# Patient Record
Sex: MALE | Race: WHITE | NOT HISPANIC OR LATINO
[De-identification: names, ages, dates, MRNs, and addresses within clinical notes are randomized per-mention and may not be internally consistent; named-entity substitution may affect disease eponyms.]

---

## 2018-04-11 PROBLEM — Z00.00 ENCOUNTER FOR PREVENTIVE HEALTH EXAMINATION: Status: ACTIVE | Noted: 2018-04-11

## 2018-04-12 ENCOUNTER — APPOINTMENT (OUTPATIENT)
Dept: HEART AND VASCULAR | Facility: CLINIC | Age: 53
End: 2018-04-12

## 2018-06-15 ENCOUNTER — APPOINTMENT (OUTPATIENT)
Dept: HEART AND VASCULAR | Facility: CLINIC | Age: 53
End: 2018-06-15
Payer: COMMERCIAL

## 2018-06-15 VITALS
HEART RATE: 87 BPM | HEIGHT: 78 IN | DIASTOLIC BLOOD PRESSURE: 70 MMHG | SYSTOLIC BLOOD PRESSURE: 115 MMHG | WEIGHT: 163.14 LBS | BODY MASS INDEX: 18.88 KG/M2

## 2018-06-15 PROCEDURE — 93000 ELECTROCARDIOGRAM COMPLETE: CPT

## 2018-06-15 PROCEDURE — 99205 OFFICE O/P NEW HI 60 MIN: CPT | Mod: 25

## 2018-06-20 ENCOUNTER — FORM ENCOUNTER (OUTPATIENT)
Age: 53
End: 2018-06-20

## 2018-06-21 ENCOUNTER — OUTPATIENT (OUTPATIENT)
Dept: OUTPATIENT SERVICES | Facility: HOSPITAL | Age: 53
LOS: 1 days | Discharge: HOME | End: 2018-06-21
Payer: COMMERCIAL

## 2018-06-21 LAB
ALBUMIN SERPL ELPH-MCNC: 4.7 G/DL — SIGNIFICANT CHANGE UP (ref 3.3–5)
ALP SERPL-CCNC: 55 U/L — SIGNIFICANT CHANGE UP (ref 40–120)
ALT FLD-CCNC: 40 U/L — SIGNIFICANT CHANGE UP (ref 10–45)
ANION GAP SERPL CALC-SCNC: 13 MMOL/L — SIGNIFICANT CHANGE UP (ref 5–17)
APTT BLD: 38.9 SEC — HIGH (ref 27.5–37.4)
AST SERPL-CCNC: 28 U/L — SIGNIFICANT CHANGE UP (ref 10–40)
BILIRUB SERPL-MCNC: 0.7 MG/DL — SIGNIFICANT CHANGE UP (ref 0.2–1.2)
BUN SERPL-MCNC: 24 MG/DL — HIGH (ref 7–23)
CALCIUM SERPL-MCNC: 9.6 MG/DL — SIGNIFICANT CHANGE UP (ref 8.4–10.5)
CHLORIDE SERPL-SCNC: 104 MMOL/L — SIGNIFICANT CHANGE UP (ref 96–108)
CO2 SERPL-SCNC: 25 MMOL/L — SIGNIFICANT CHANGE UP (ref 22–31)
CREAT SERPL-MCNC: 1.24 MG/DL — SIGNIFICANT CHANGE UP (ref 0.5–1.3)
GLUCOSE SERPL-MCNC: 101 MG/DL — HIGH (ref 70–99)
HCT VFR BLD CALC: 50.3 % — HIGH (ref 39–50)
HGB BLD-MCNC: 17 G/DL — SIGNIFICANT CHANGE UP (ref 13–17)
INR BLD: 1.29 — HIGH (ref 0.88–1.16)
MCHC RBC-ENTMCNC: 29.5 PG — SIGNIFICANT CHANGE UP (ref 27–34)
MCHC RBC-ENTMCNC: 33.8 G/DL — SIGNIFICANT CHANGE UP (ref 32–36)
MCV RBC AUTO: 87.2 FL — SIGNIFICANT CHANGE UP (ref 80–100)
PLATELET # BLD AUTO: 211 K/UL — SIGNIFICANT CHANGE UP (ref 150–400)
POTASSIUM SERPL-MCNC: 4.2 MMOL/L — SIGNIFICANT CHANGE UP (ref 3.5–5.3)
POTASSIUM SERPL-SCNC: 4.2 MMOL/L — SIGNIFICANT CHANGE UP (ref 3.5–5.3)
PROT SERPL-MCNC: 7.2 G/DL — SIGNIFICANT CHANGE UP (ref 6–8.3)
PROTHROM AB SERPL-ACNC: 14.4 SEC — HIGH (ref 9.8–12.7)
RBC # BLD: 5.77 M/UL — SIGNIFICANT CHANGE UP (ref 4.2–5.8)
RBC # FLD: 13.1 % — SIGNIFICANT CHANGE UP (ref 10.3–16.9)
SODIUM SERPL-SCNC: 142 MMOL/L — SIGNIFICANT CHANGE UP (ref 135–145)
WBC # BLD: 7.1 K/UL — SIGNIFICANT CHANGE UP (ref 3.8–10.5)
WBC # FLD AUTO: 7.1 K/UL — SIGNIFICANT CHANGE UP (ref 3.8–10.5)

## 2018-06-21 PROCEDURE — 93325 DOPPLER ECHO COLOR FLOW MAPG: CPT | Mod: 26

## 2018-06-21 PROCEDURE — 80053 COMPREHEN METABOLIC PANEL: CPT

## 2018-06-21 PROCEDURE — 85730 THROMBOPLASTIN TIME PARTIAL: CPT

## 2018-06-21 PROCEDURE — 85610 PROTHROMBIN TIME: CPT

## 2018-06-21 PROCEDURE — 85027 COMPLETE CBC AUTOMATED: CPT

## 2018-06-21 PROCEDURE — 93312 ECHO TRANSESOPHAGEAL: CPT

## 2018-06-21 PROCEDURE — 93320 DOPPLER ECHO COMPLETE: CPT | Mod: 26

## 2018-06-21 PROCEDURE — 93312 ECHO TRANSESOPHAGEAL: CPT | Mod: 26

## 2018-06-21 PROCEDURE — 92960 CARDIOVERSION ELECTRIC EXT: CPT

## 2018-06-22 ENCOUNTER — RX RENEWAL (OUTPATIENT)
Age: 53
End: 2018-06-22

## 2018-06-26 ENCOUNTER — FORM ENCOUNTER (OUTPATIENT)
Age: 53
End: 2018-06-26

## 2018-06-27 ENCOUNTER — APPOINTMENT (OUTPATIENT)
Dept: CT IMAGING | Facility: HOSPITAL | Age: 53
End: 2018-06-27
Payer: COMMERCIAL

## 2018-06-27 ENCOUNTER — OUTPATIENT (OUTPATIENT)
Dept: OUTPATIENT SERVICES | Facility: HOSPITAL | Age: 53
LOS: 1 days | End: 2018-06-27
Payer: COMMERCIAL

## 2018-06-27 PROCEDURE — 75572 CT HRT W/3D IMAGE: CPT | Mod: 26

## 2018-06-28 ENCOUNTER — INPATIENT (INPATIENT)
Facility: HOSPITAL | Age: 53
LOS: 0 days | Discharge: HOME | DRG: 272 | End: 2018-06-29
Attending: INTERNAL MEDICINE | Admitting: INTERNAL MEDICINE
Payer: COMMERCIAL

## 2018-06-28 VITALS
SYSTOLIC BLOOD PRESSURE: 130 MMHG | DIASTOLIC BLOOD PRESSURE: 80 MMHG | OXYGEN SATURATION: 100 % | HEART RATE: 60 BPM | RESPIRATION RATE: 15 BRPM

## 2018-06-28 DIAGNOSIS — I48.0 PAROXYSMAL ATRIAL FIBRILLATION: ICD-10-CM

## 2018-06-28 LAB
ANION GAP SERPL CALC-SCNC: 13 MMOL/L — SIGNIFICANT CHANGE UP (ref 5–17)
APTT BLD: 39.9 SEC — HIGH (ref 27.5–37.4)
BUN SERPL-MCNC: 16 MG/DL — SIGNIFICANT CHANGE UP (ref 7–23)
CALCIUM SERPL-MCNC: 9.1 MG/DL — SIGNIFICANT CHANGE UP (ref 8.4–10.5)
CHLORIDE SERPL-SCNC: 104 MMOL/L — SIGNIFICANT CHANGE UP (ref 96–108)
CO2 SERPL-SCNC: 24 MMOL/L — SIGNIFICANT CHANGE UP (ref 22–31)
CREAT SERPL-MCNC: 1.41 MG/DL — HIGH (ref 0.5–1.3)
GLUCOSE SERPL-MCNC: 99 MG/DL — SIGNIFICANT CHANGE UP (ref 70–99)
HCT VFR BLD CALC: 41.6 % — SIGNIFICANT CHANGE UP (ref 39–50)
HGB BLD-MCNC: 13.8 G/DL — SIGNIFICANT CHANGE UP (ref 13–17)
INR BLD: 1.4 — HIGH (ref 0.88–1.16)
MCHC RBC-ENTMCNC: 29 PG — SIGNIFICANT CHANGE UP (ref 27–34)
MCHC RBC-ENTMCNC: 33.2 G/DL — SIGNIFICANT CHANGE UP (ref 32–36)
MCV RBC AUTO: 87.4 FL — SIGNIFICANT CHANGE UP (ref 80–100)
PLATELET # BLD AUTO: 202 K/UL — SIGNIFICANT CHANGE UP (ref 150–400)
POTASSIUM SERPL-MCNC: 4.1 MMOL/L — SIGNIFICANT CHANGE UP (ref 3.5–5.3)
POTASSIUM SERPL-SCNC: 4.1 MMOL/L — SIGNIFICANT CHANGE UP (ref 3.5–5.3)
PROTHROM AB SERPL-ACNC: 15.7 SEC — HIGH (ref 9.8–12.7)
RBC # BLD: 4.76 M/UL — SIGNIFICANT CHANGE UP (ref 4.2–5.8)
RBC # FLD: 13 % — SIGNIFICANT CHANGE UP (ref 10.3–16.9)
SODIUM SERPL-SCNC: 141 MMOL/L — SIGNIFICANT CHANGE UP (ref 135–145)
WBC # BLD: 7.2 K/UL — SIGNIFICANT CHANGE UP (ref 3.8–10.5)
WBC # FLD AUTO: 7.2 K/UL — SIGNIFICANT CHANGE UP (ref 3.8–10.5)

## 2018-06-28 PROCEDURE — 75572 CT HRT W/3D IMAGE: CPT

## 2018-06-28 PROCEDURE — 93662 INTRACARDIAC ECG (ICE): CPT | Mod: 26

## 2018-06-28 PROCEDURE — 93613 INTRACARDIAC EPHYS 3D MAPG: CPT

## 2018-06-28 PROCEDURE — 93656 COMPRE EP EVAL ABLTJ ATR FIB: CPT

## 2018-06-28 RX ORDER — PANTOPRAZOLE SODIUM 20 MG/1
40 TABLET, DELAYED RELEASE ORAL DAILY
Qty: 0 | Refills: 0 | Status: DISCONTINUED | OUTPATIENT
Start: 2018-06-28 | End: 2018-06-29

## 2018-06-28 RX ORDER — APIXABAN 2.5 MG/1
5 TABLET, FILM COATED ORAL
Qty: 0 | Refills: 0 | Status: DISCONTINUED | OUTPATIENT
Start: 2018-06-28 | End: 2018-06-29

## 2018-06-28 RX ORDER — DRONEDARONE 400 MG/1
1 TABLET, FILM COATED ORAL
Qty: 0 | Refills: 0 | COMMUNITY

## 2018-06-28 RX ORDER — DRONEDARONE 400 MG/1
400 TABLET, FILM COATED ORAL
Qty: 0 | Refills: 0 | Status: DISCONTINUED | OUTPATIENT
Start: 2018-06-28 | End: 2018-06-29

## 2018-06-28 RX ORDER — APIXABAN 2.5 MG/1
1 TABLET, FILM COATED ORAL
Qty: 0 | Refills: 0 | COMMUNITY

## 2018-06-28 RX ADMIN — DRONEDARONE 400 MILLIGRAM(S): 400 TABLET, FILM COATED ORAL at 18:42

## 2018-06-28 RX ADMIN — PANTOPRAZOLE SODIUM 40 MILLIGRAM(S): 20 TABLET, DELAYED RELEASE ORAL at 18:42

## 2018-06-28 RX ADMIN — APIXABAN 5 MILLIGRAM(S): 2.5 TABLET, FILM COATED ORAL at 18:42

## 2018-06-28 NOTE — H&P ADULT - ASSESSMENT
Mr. Cespedes is a 53 year old male with paroxysmal AF who presents for elective ablation of atrial fibrillation.

## 2018-06-28 NOTE — PROGRESS NOTE ADULT - SUBJECTIVE AND OBJECTIVE BOX
MAITE OLIVAS  [id number]    PROCEDURE:    CRYOablation of pulmonary veins via RFV and LFV      INDICATION:    Persistent atrial fibrillation that has broken through MULTAQ      ELECTROPHYSIOLOGIST(S):    MD Armin      ANESTHESIOLOGY:    MD Good    FINDINGS:    Successful isolation of all 4 pulmonary veins    COMPLICATIONS:    None    RECOMMENDATIONS:    Resume Eliquis / Multaq  Monitor BP to adjust medication as needed.

## 2018-06-29 ENCOUNTER — TRANSCRIPTION ENCOUNTER (OUTPATIENT)
Age: 53
End: 2018-06-29

## 2018-06-29 VITALS
DIASTOLIC BLOOD PRESSURE: 64 MMHG | SYSTOLIC BLOOD PRESSURE: 139 MMHG | RESPIRATION RATE: 18 BRPM | HEART RATE: 62 BPM | OXYGEN SATURATION: 97 %

## 2018-06-29 RX ORDER — PANTOPRAZOLE SODIUM 20 MG/1
1 TABLET, DELAYED RELEASE ORAL
Qty: 30 | Refills: 0 | OUTPATIENT
Start: 2018-06-29 | End: 2018-07-28

## 2018-06-29 RX ADMIN — APIXABAN 5 MILLIGRAM(S): 2.5 TABLET, FILM COATED ORAL at 07:23

## 2018-06-29 RX ADMIN — DRONEDARONE 400 MILLIGRAM(S): 400 TABLET, FILM COATED ORAL at 07:23

## 2018-06-29 NOTE — DISCHARGE NOTE ADULT - PATIENT PORTAL LINK FT
You can access the [facility] Patient Portal, offered by [facility], by registering with the following website: [email or web address]

## 2018-06-29 NOTE — DISCHARGE NOTE ADULT - ADDITIONAL INSTRUCTIONS
PLEASE CALL THE EP CLINIC TO SCHEDULE AN APPOINTMENT WITH DR. RIVERO (OR ONE OF THIS COLLEAGUES) [phone].

## 2018-06-29 NOTE — DISCHARGE NOTE ADULT - NS AS ACTIVITY OBS
No weight lifting or lifting heavy weights for 2 weeks. No sex for one week. No strenuous activities. No excessive stairs. You can shower today. No Swimming, no hot tubs, no tub baths for 2 weeks./Walking-Outdoors allowed/No Heavy lifting/straining/Walking-Indoors allowed

## 2018-06-29 NOTE — DISCHARGE NOTE ADULT - CARE PROVIDER_API CALL
Tobias Funez), Cardiac Electrophysiology; Cardiovascular Disease  [address]  [address] [place] [address]  Phone: [phone]  Fax: [phone]    Norma Black (NP; RN), NP in Adult Gerontology Acute Care  [address] [address]  Phone: [phone]  Fax: [phone]    Marcy Bartholomew (NP; RN), NP in Adult Gerontology Acute Care  [address] [place] [address]  Phone: [phone]  Fax: [phone]    Shahriar Jo), Gastroenterology; Internal Medicine  [address]  [address] [place] [address]  Phone: [phone]  Fax: [phone]

## 2018-06-29 NOTE — DISCHARGE NOTE ADULT - OTHER SIGNIFICANT FINDINGS
13.8   7.2   )-----------( 202      ( 28 Jun 2018 08:09 )             41.6       06-28    141  |  104  |  16  ----------------------------<  99  4.1   |  24  |  1.41<H>    Ca    9.1      28 Jun 2018 08:09      PT/INR - ( 28 Jun 2018 08:09 )   PT: 15.7 sec;   INR: 1.40     PTT - ( 28 Jun 2018 08:09 )  PTT:39.9 sec

## 2018-06-29 NOTE — DISCHARGE NOTE ADULT - CARE PLAN
Principal Discharge DX:	Paroxysmal atrial fibrillation  Goal:	Maintain normal sinus rhythm  Assessment and plan of treatment:	- Continue taking Multaq and Eliquis. It is especially important not to miss any doses of Eliquis.  - If you have any pain, swelling, or bleeding at the groins please call the office   - Follow-up in EPS Clinic with Dr. Funez in 3-4 weeks. (Dr. Funez is away July 16th - August 3rd). If you are unable to get an appointment with Dr. Funez before he leaves for vacation, one his colleagues can see you. Please call the office to schedule an appointment [phone].   -

## 2018-06-29 NOTE — DISCHARGE NOTE ADULT - CARE PROVIDERS DIRECT ADDRESSES
[email or web address],[email or web address]

## 2018-06-29 NOTE — DISCHARGE NOTE ADULT - HOSPITAL COURSE
Mr. Cespedes is a 53 year old male with paroxysmal AF who presents for elective ablation of atrial fibrillation.    Pt c/o palpitations x 7 years which were usually provoked by sports (skiing, tennis, etc.). The episodes over the past 7 years were mild and short lived. Recently (April 7, 2018) while skiing in [place] while at altitude, he noted profound fatigue, lightheadedness, and palpitations. He went to the ED and was noted to be in AFib. He was given Xarelto which he took for a few days. He elected not to be cardioverted at that time and the arrythmia lasted 5 days. He had an echo at that time which showed EF 60%. Since April 2018, he has been more aware of his heart irregularities. He frequently feels palpitations, even with minimal exertion. He feels that at this point, he can barely play tennis. He saw Dr. Arellano who gave him an event monitor which showed many episodes of pAF, total burden 34%, both with RVR and SVR. LTM also notable for SB in mid-30s. He was given metoprolol tartrate that he was instructed to take with palpitations.  He was recently started on Multaq.  He notes compliance with his Eliquis (last dose 6/27 pm).      The patient underwent uncomplicated Cryoablation of atrial fibrillation. Patient ambulated, ate, used restroom without difficulty. Bilateral groin sites were inspected and found to be clean, dry, intact, without swelling, pain, pr oozing. Dressings were removed. Patient states he feels well this morning, offers no complaints. He is ready to go home and is deemed stable for discharge.

## 2018-06-29 NOTE — DISCHARGE NOTE ADULT - MEDICATION SUMMARY - MEDICATIONS TO TAKE
I will START or STAY ON the medications listed below when I get home from the hospital:    Multaq 400 mg oral tablet  -- 1 tab(s) by mouth 2 times a day  -- Indication: For Atrial fibrillation    Eliquis 5 mg oral tablet  -- 1 tab(s) by mouth 2 times a day  -- Indication: For Stroke Prevention (for atrial finrillation)    Protonix 40 mg oral delayed release tablet  -- 1 tab(s) by mouth once a day (in the morning) before breakfast. For 30 days only Post ablation. No refills.   -- Indication: For GERD prevention

## 2018-06-29 NOTE — DISCHARGE NOTE ADULT - PLAN OF CARE
- Continue taking Multaq and Eliquis. It is especially important not to miss any doses of Eliquis.  - If you have any pain, swelling, or bleeding at the groins please call the office   - Follow-up in EPS Clinic with Dr. Funez in 3-4 weeks. (Dr. Funez is away July 16th - August 3rd). If you are unable to get an appointment with Dr. Funez before he leaves for vacation, one his colleagues can see you. Please call the office to schedule an appointment [phone].   - Maintain normal sinus rhythm

## 2018-07-03 DIAGNOSIS — Z79.01 LONG TERM (CURRENT) USE OF ANTICOAGULANTS: ICD-10-CM

## 2018-07-03 DIAGNOSIS — I48.0 PAROXYSMAL ATRIAL FIBRILLATION: ICD-10-CM

## 2018-07-05 PROCEDURE — 36415 COLL VENOUS BLD VENIPUNCTURE: CPT

## 2018-07-05 PROCEDURE — C1733: CPT

## 2018-07-05 PROCEDURE — 86901 BLOOD TYPING SEROLOGIC RH(D): CPT

## 2018-07-05 PROCEDURE — 85730 THROMBOPLASTIN TIME PARTIAL: CPT

## 2018-07-05 PROCEDURE — 86900 BLOOD TYPING SEROLOGIC ABO: CPT

## 2018-07-05 PROCEDURE — 80048 BASIC METABOLIC PNL TOTAL CA: CPT

## 2018-07-05 PROCEDURE — C1894: CPT

## 2018-07-05 PROCEDURE — C1766: CPT

## 2018-07-05 PROCEDURE — C1893: CPT

## 2018-07-05 PROCEDURE — 85027 COMPLETE CBC AUTOMATED: CPT

## 2018-07-05 PROCEDURE — 85610 PROTHROMBIN TIME: CPT

## 2018-07-05 PROCEDURE — C1769: CPT

## 2018-07-05 PROCEDURE — C1730: CPT

## 2018-07-05 PROCEDURE — 86850 RBC ANTIBODY SCREEN: CPT

## 2018-07-05 PROCEDURE — C1759: CPT

## 2018-07-17 PROBLEM — I48.0 PAROXYSMAL ATRIAL FIBRILLATION: Chronic | Status: ACTIVE | Noted: 2018-06-28

## 2018-09-04 ENCOUNTER — APPOINTMENT (OUTPATIENT)
Dept: HEART AND VASCULAR | Facility: CLINIC | Age: 53
End: 2018-09-04
Payer: COMMERCIAL

## 2018-09-04 VITALS
SYSTOLIC BLOOD PRESSURE: 139 MMHG | HEIGHT: 78 IN | DIASTOLIC BLOOD PRESSURE: 77 MMHG | BODY MASS INDEX: 20.25 KG/M2 | WEIGHT: 175 LBS | HEART RATE: 69 BPM

## 2018-09-04 PROCEDURE — 99215 OFFICE O/P EST HI 40 MIN: CPT | Mod: 25

## 2018-09-04 PROCEDURE — 93000 ELECTROCARDIOGRAM COMPLETE: CPT

## 2018-09-04 RX ORDER — RIVAROXABAN 20 MG/1
20 TABLET, FILM COATED ORAL
Qty: 4 | Refills: 0 | Status: COMPLETED | COMMUNITY
Start: 2018-04-13

## 2018-09-04 RX ORDER — DRONEDARONE 400 MG/1
400 TABLET, FILM COATED ORAL TWICE DAILY
Qty: 60 | Refills: 4 | Status: DISCONTINUED | COMMUNITY
Start: 2018-06-22 | End: 2018-09-04

## 2018-09-04 RX ORDER — PANTOPRAZOLE 40 MG/1
40 TABLET, DELAYED RELEASE ORAL
Qty: 30 | Refills: 0 | Status: COMPLETED | COMMUNITY
Start: 2018-06-29

## 2018-09-06 RX ORDER — METOPROLOL TARTRATE 25 MG/1
25 TABLET, FILM COATED ORAL
Qty: 30 | Refills: 0 | Status: ACTIVE | COMMUNITY
Start: 2018-05-29

## 2018-09-27 ENCOUNTER — FORM ENCOUNTER (OUTPATIENT)
Age: 53
End: 2018-09-27

## 2018-09-28 ENCOUNTER — OUTPATIENT (OUTPATIENT)
Dept: OUTPATIENT SERVICES | Facility: HOSPITAL | Age: 53
LOS: 1 days | End: 2018-09-28
Payer: COMMERCIAL

## 2018-09-28 DIAGNOSIS — I48.0 PAROXYSMAL ATRIAL FIBRILLATION: ICD-10-CM

## 2018-09-28 PROCEDURE — 93306 TTE W/DOPPLER COMPLETE: CPT

## 2018-09-28 PROCEDURE — 93306 TTE W/DOPPLER COMPLETE: CPT | Mod: 26

## 2018-10-19 ENCOUNTER — RX RENEWAL (OUTPATIENT)
Age: 53
End: 2018-10-19

## 2018-10-30 ENCOUNTER — APPOINTMENT (OUTPATIENT)
Dept: HEART AND VASCULAR | Facility: CLINIC | Age: 53
End: 2018-10-30
Payer: COMMERCIAL

## 2018-10-30 PROCEDURE — 93228 REMOTE 30 DAY ECG REV/REPORT: CPT

## 2019-04-30 ENCOUNTER — NON-APPOINTMENT (OUTPATIENT)
Age: 54
End: 2019-04-30

## 2019-04-30 ENCOUNTER — APPOINTMENT (OUTPATIENT)
Dept: HEART AND VASCULAR | Facility: CLINIC | Age: 54
End: 2019-04-30
Payer: COMMERCIAL

## 2019-04-30 VITALS — SYSTOLIC BLOOD PRESSURE: 142 MMHG | DIASTOLIC BLOOD PRESSURE: 94 MMHG | HEART RATE: 65 BPM

## 2019-04-30 DIAGNOSIS — I48.0 PAROXYSMAL ATRIAL FIBRILLATION: ICD-10-CM

## 2019-04-30 DIAGNOSIS — R00.2 PALPITATIONS: ICD-10-CM

## 2019-04-30 PROCEDURE — 99214 OFFICE O/P EST MOD 30 MIN: CPT | Mod: 25

## 2019-04-30 PROCEDURE — 93000 ELECTROCARDIOGRAM COMPLETE: CPT

## 2019-04-30 RX ORDER — APIXABAN 5 MG/1
5 TABLET, FILM COATED ORAL
Qty: 60 | Refills: 3 | Status: DISCONTINUED | COMMUNITY
Start: 2018-06-15 | End: 2019-04-30

## 2019-05-02 PROBLEM — R00.2 HEART PALPITATIONS: Status: ACTIVE | Noted: 2019-05-02

## 2019-05-02 PROBLEM — I48.0 PAROXYSMAL ATRIAL FIBRILLATION: Status: ACTIVE | Noted: 2018-06-15

## 2019-05-02 NOTE — REASON FOR VISIT
[Follow-Up - Clinic] : a clinic follow-up of [Atrial Fibrillation] : atrial fibrillation [Spouse] : spouse [FreeTextEntry1] : Mr. Cespedes is a 54 y.o. M with pmhx of paroxysmal AF s/p DCCV 6/21/2018 and PVI 6/26/18 who presents for follow-up. \par \par He has been off MULTAQ since Summer 2018 and feels very well.  Two episodes of frequent skipped beats during high intensity tennis recently.  Otherwise no rapid/irregular heart action, CP, dizziness, presyncope or syncope.  He is off anticoagulation.

## 2019-05-02 NOTE — REVIEW OF SYSTEMS
[Dyspnea on exertion] : dyspnea during exertion [see HPI] : see HPI [Negative] : Heme/Lymph [Chest Pain] : no chest pain [Shortness Of Breath] : no shortness of breath [Lower Ext Edema] : no extremity edema [Cough] : no cough [Palpitations] : no palpitations [Wheezing] : no wheezing [Coughing Up Blood] : no hemoptysis

## 2019-05-02 NOTE — PHYSICAL EXAM
[General Appearance - Well Developed] : well developed [Normal Appearance] : normal appearance [Well Groomed] : well groomed [General Appearance - Well Nourished] : well nourished [No Deformities] : no deformities [General Appearance - In No Acute Distress] : no acute distress [Normal Conjunctiva] : the conjunctiva exhibited no abnormalities [Normal Oral Mucosa] : normal oral mucosa [No Oral Pallor] : no oral pallor [No Oral Cyanosis] : no oral cyanosis [Normal Oropharynx] : normal oropharynx [Respiration, Rhythm And Depth] : normal respiratory rhythm and effort [Exaggerated Use Of Accessory Muscles For Inspiration] : no accessory muscle use [Auscultation Breath Sounds / Voice Sounds] : lungs were clear to auscultation bilaterally [Bowel Sounds] : normal bowel sounds [Abdomen Soft] : soft [Abdomen Tenderness] : non-tender [Abnormal Walk] : normal gait [Nail Clubbing] : no clubbing of the fingernails [Cyanosis, Localized] : no localized cyanosis [Skin Color & Pigmentation] : normal skin color and pigmentation [Skin Turgor] : normal skin turgor [] : no rash [Oriented To Time, Place, And Person] : oriented to person, place, and time [Impaired Insight] : insight and judgment were intact [Affect] : the affect was normal [Memory Recent] : recent memory was not impaired [5th Left ICS - MCL] : palpated at the 5th LICS in the midclavicular line [Normal] : normal [Normal Rate] : normal [Rhythm Regular] : regular [Normal S1] : normal S1 [Normal S2] : normal S2 [No Murmur] : no murmurs heard [2+] : left 2+ [No Pitting Edema] : no pitting edema present [FreeTextEntry1] : no JVD

## 2019-05-03 ENCOUNTER — APPOINTMENT (OUTPATIENT)
Dept: GASTROENTEROLOGY | Facility: CLINIC | Age: 54
End: 2019-05-03

## 2020-01-24 ENCOUNTER — APPOINTMENT (OUTPATIENT)
Dept: GASTROENTEROLOGY | Facility: CLINIC | Age: 55
End: 2020-01-24

## 2020-11-02 ENCOUNTER — NON-APPOINTMENT (OUTPATIENT)
Age: 55
End: 2020-11-02

## 2020-11-02 RX ORDER — APIXABAN 5 MG/1
5 TABLET, FILM COATED ORAL
Qty: 60 | Refills: 0 | Status: ACTIVE | COMMUNITY
Start: 2020-11-02 | End: 1900-01-01

## 2020-11-02 RX ORDER — METOPROLOL SUCCINATE 25 MG/1
25 TABLET, EXTENDED RELEASE ORAL
Qty: 60 | Refills: 0 | Status: ACTIVE | COMMUNITY
Start: 2020-11-02 | End: 1900-01-01

## 2021-06-01 ENCOUNTER — APPOINTMENT (OUTPATIENT)
Dept: HEART AND VASCULAR | Facility: CLINIC | Age: 56
End: 2021-06-01
Payer: COMMERCIAL

## 2021-06-01 PROCEDURE — 99442: CPT

## 2021-06-01 RX ORDER — CANDESARTAN CILEXETIL 16 MG/1
16 TABLET ORAL
Refills: 0 | Status: ACTIVE | COMMUNITY

## 2021-06-29 ENCOUNTER — APPOINTMENT (OUTPATIENT)
Dept: HEART AND VASCULAR | Facility: CLINIC | Age: 56
End: 2021-06-29

## 2022-04-10 NOTE — H&P ADULT - HISTORY OF PRESENT ILLNESS
Mr. Cespedes is a 53 year old male with paroxysmal AF who presents for elective ablation of atrial fibrillation.    Pt c/o palpitations x 7 years which were usually provoked by sports (skiing, tennis, etc.). The episodes over the past 7 years were mild and short lived. Recently (April 7, 2018) while skiing in [place] while at altitude, he noted profound fatigue, lightheadedness, and palpitations. He went to the ED and was noted to be in AFib. He was given Xarelto which he took for a few days. He elected not to be cardioverted at that time and the arrythmia lasted 5 days. He had an echo at that time which showed EF 60%. Since April 2018, he has been more aware of his heart irregularities. He frequently feels palpitations, even with minimal exertion. He feels that at this point, he can barely play tennis. He saw Dr. Arellano who gave him an event monitor which showed many episodes of pAF, total burden 34%, both with RVR and SVR. LTM also notable for SB in mid-30s. He was given metoprolol tartrate that he was instructed to take with palpitations.  He was recently started on Multaq.  He notes compliance with his Eliquis (last dose 6/27 pm).  He now presents for elective ablation of atrial fibrillation.
normal